# Patient Record
Sex: FEMALE | Race: OTHER | HISPANIC OR LATINO | URBAN - METROPOLITAN AREA
[De-identification: names, ages, dates, MRNs, and addresses within clinical notes are randomized per-mention and may not be internally consistent; named-entity substitution may affect disease eponyms.]

---

## 2022-07-07 NOTE — H&P ADULT - NSHPLABSRESULTS_GEN_ALL_CORE
EKG 7/1/22 SR rate 62 bpm    Cardiac PET 7/1/22- inferior wall ischemia EKG 7/1/22 SR rate 62 bpm    Cardiac PET 7/1/22- inferior wall ischemia, EF 72%

## 2022-07-07 NOTE — H&P ADULT - NSHPREVIEWOFSYSTEMS_GEN_ALL_CORE
General: Pt denies recent weight loss/fever/chills  Neurological: denies numbness or  sensation loss  Cardiovascular: denies chest pain/palpitations/leg edema  Respiratory and Thorax: denies SOB/cough/wheezing  Gastrointestinal: denies abdominal pain/diarrhea/ nausea/ vomiting/ constipation/bloody stool  Genitourinary: denies urinary frequency/urgency/ dysuria/ hematuria   Musculoskeletal: denies joint pain or swelling/ restricted range of motion  Hematologic: denies abnormal bleeding

## 2022-07-07 NOTE — H&P ADULT - PROBLEM SELECTOR PLAN 1
a/w GARCIA and CP   -plan for cardiac cath for ischemic work up  - IVF  cc bolus   -Consent obtained for cardiac catheterization w/ coronary angiogram and possible stent placement. Pt is competent, has capacity, and understands risks and benefits of procedure. Risks and benefits discussed. Risk discussed included, but not limited to MI, stroke, mortality, major bleeding, arrythmia, or infection. All questions answered -plan for cardiac cath for ischemic work up  - IVF  cc bolus   -Consent obtained for cardiac catheterization w/ coronary angiogram and possible stent placement. Pt is competent, has capacity, and understands risks and benefits of procedure. Risks and benefits discussed. Risk discussed included, but not limited to MI, stroke, mortality, major bleeding, arrythmia, or infection. All questions answered

## 2022-07-07 NOTE — H&P ADULT - HISTORY OF PRESENT ILLNESS
70 y/o F with PMHx of HLD, HTN uncontrolled, presented to cardiology with c/o CP and GARCIA. Cardiac PET scan done suggestive of ischemia. Referred for cardiac cath to further evaluate. COVID neg PST  70 y/o F with PMHx of HLD, HTN presented to cardiology with questionable aorta dilation, which was incidentally found on Xray taken for sinus infection. Cardiac PET scan done suggestive of inferior wall ischemia. Referred for cardiac cath to further evaluate. COVID neg PST  72 y/o F with PMHx of HLD, HTN presented to cardiology with questionable aorta dilation, which was incidentally found on Xray taken for sinus infection. Cardiac PET scan done suggestive of inferior wall ischemia. Referred for cardiac cath to further evaluate. COVID neg PST

## 2022-07-07 NOTE — H&P ADULT - ASSESSMENT
70 y/o F with PMHx of HLD, HTN uncontrolled, presented to cardiology with c/o CP and GARCIA. Cardiac PET scan done suggestive of ischemia.     ASA class:  Creatinine:  GFR:  Bleeding  Risk score:  Rk Score:  70 y/o F with PMHx of HLD, HTN presented to cardiology with questionable aorta dilation, which was incidentally found on Xray taken for sinus infection. Cardiac PET scan done suggestive of inferior wall ischemia. Referred for cardiac cath to further evaluate.    ASA class: II  Creatinine: 0.6  GFR: 96  Bleeding  Risk score: 2.1%  Rk Score: 1 point

## 2022-07-07 NOTE — H&P ADULT - NSHPPHYSICALEXAM_GEN_ALL_CORE
Vital Signs :   BP:     157/79   HR:  54    RR:     16   O2 sat;     97% with RA     Constitutional: well developed, well nourished, no deformities and no acute distress  Neurological: Alert & Oriented x 3, METCALF, no focal deficits  HEENT: NC/AT, PERRLA, EOMI,  Neck supple.  Respiratory: CTA B/L, No wheezing/crackles/rhonchi  Cardiovascular: (+) S1 & S2, RRR, No murmur/ rub/ gallop   Gastrointestinal: soft, Nontender, nondistended, (+) BS  Genitourinary: non distended bladder, voiding freely  Extremities: No pedal edema, No clubbing, No cyanosis, 2+  PT/ DP pulses   Skin:  normal skin color and pigmentation, no skin lesions

## 2022-07-07 NOTE — CHART NOTE - NSCHARTNOTEFT_GEN_A_CORE
Preop Phone Call: [phone]  - Able to Reach Patient:  yes  - Info given to:	patient having cardiac catheterization  -  and allergies confirmed: yes  - Medication Information Given: yes  - Medications To Take (specify)	Ok to take a.m. meds w/sip of water  - Medications To Hold (Specify)	none   - Arrival Time: 0815  - NPO after:	0000  - Understanding of Information Verbalized: yes  will have a  over the age of 18  for d/c home  - Understanding of possible overnight stay if stent is placed: yes

## 2022-07-08 ENCOUNTER — OUTPATIENT (OUTPATIENT)
Dept: OUTPATIENT SERVICES | Facility: HOSPITAL | Age: 72
LOS: 1 days | Discharge: HOME | End: 2022-07-08
Payer: MEDICARE

## 2022-07-08 VITALS
HEART RATE: 56 BPM | SYSTOLIC BLOOD PRESSURE: 157 MMHG | HEIGHT: 63 IN | RESPIRATION RATE: 18 BRPM | OXYGEN SATURATION: 100 % | TEMPERATURE: 98 F | WEIGHT: 121.92 LBS | DIASTOLIC BLOOD PRESSURE: 79 MMHG

## 2022-07-08 VITALS
OXYGEN SATURATION: 95 % | SYSTOLIC BLOOD PRESSURE: 139 MMHG | HEART RATE: 58 BPM | RESPIRATION RATE: 16 BRPM | DIASTOLIC BLOOD PRESSURE: 71 MMHG

## 2022-07-08 DIAGNOSIS — R94.39 ABNORMAL RESULT OF OTHER CARDIOVASCULAR FUNCTION STUDY: ICD-10-CM

## 2022-07-08 PROCEDURE — C1769: CPT

## 2022-07-08 PROCEDURE — 93454 CORONARY ARTERY ANGIO S&I: CPT

## 2022-07-08 PROCEDURE — 99153 MOD SED SAME PHYS/QHP EA: CPT

## 2022-07-08 PROCEDURE — 99152 MOD SED SAME PHYS/QHP 5/>YRS: CPT

## 2022-07-08 PROCEDURE — C1894: CPT

## 2022-07-08 PROCEDURE — C1887: CPT

## 2022-07-08 RX ORDER — ROSUVASTATIN CALCIUM 5 MG/1
1 TABLET ORAL
Qty: 0 | Refills: 0 | DISCHARGE

## 2022-07-08 RX ORDER — ASPIRIN/CALCIUM CARB/MAGNESIUM 324 MG
1 TABLET ORAL
Qty: 0 | Refills: 0 | DISCHARGE

## 2022-07-08 RX ORDER — SODIUM CHLORIDE 9 MG/ML
250 INJECTION INTRAMUSCULAR; INTRAVENOUS; SUBCUTANEOUS ONCE
Refills: 0 | Status: COMPLETED | OUTPATIENT
Start: 2022-07-08 | End: 2022-07-08

## 2022-07-08 RX ORDER — FLUTICASONE PROPIONATE 50 MCG
1 SPRAY, SUSPENSION NASAL
Qty: 0 | Refills: 0 | DISCHARGE

## 2022-07-08 RX ORDER — METOPROLOL TARTRATE 50 MG
1 TABLET ORAL
Qty: 0 | Refills: 0 | DISCHARGE

## 2022-07-08 RX ORDER — SODIUM CHLORIDE 9 MG/ML
1000 INJECTION INTRAMUSCULAR; INTRAVENOUS; SUBCUTANEOUS
Refills: 0 | Status: DISCONTINUED | OUTPATIENT
Start: 2022-07-08 | End: 2022-07-08

## 2022-07-08 RX ORDER — LEVOCETIRIZINE DIHYDROCHLORIDE 0.5 MG/ML
1 SOLUTION ORAL
Qty: 0 | Refills: 0 | DISCHARGE

## 2022-07-08 RX ADMIN — SODIUM CHLORIDE 250 MILLILITER(S): 9 INJECTION INTRAMUSCULAR; INTRAVENOUS; SUBCUTANEOUS at 09:56

## 2022-07-08 RX ADMIN — SODIUM CHLORIDE 122 MILLILITER(S): 9 INJECTION INTRAMUSCULAR; INTRAVENOUS; SUBCUTANEOUS at 12:24

## 2022-07-08 NOTE — PACU DISCHARGE NOTE - COMMENTS
Patient s/p LHC. Patient being discharged home as per orders. Patient A&Ox4. Vital signs stable. PIV removed. No evidence of infiltration noted at discharge. Patient with no complaints of pain, SOB, or chest pain at discharge. Right Radial dressing clean dry intact, no s/s of hematoma or bleeding noted.  All discharge paperwork reviewed with patient including medication list. Patient to follow up as directed, patient verbalized understanding to all and without concerns at this time. Patient transported safely off of unit.

## 2022-07-08 NOTE — ASU PATIENT PROFILE, ADULT - FALL HARM RISK - UNIVERSAL INTERVENTIONS
Bed in lowest position, wheels locked, appropriate side rails in place/Call bell, personal items and telephone in reach/Instruct patient to call for assistance before getting out of bed or chair/Non-slip footwear when patient is out of bed/[place] to call system/Physically safe environment - no spills, clutter or unnecessary equipment/Purposeful Proactive Rounding/Room/bathroom lighting operational, light cord in reach

## 2022-07-08 NOTE — PROGRESS NOTE ADULT - SUBJECTIVE AND OBJECTIVE BOX
HPI:  70 y/o F with PMHx of HLD, HTN presented to cardiology with questionable aorta dilation, which was incidentally found on Xray taken for sinus infection. Cardiac PET scan done suggestive of inferior wall ischemia. Referred for cardiac cath to further evaluate.     Now, pt is s/p LHC, revealed non-obstructive CAD     ROS: denies chest pain/ pressure, SOB or palpitation     Vital Signs;  T(C): 36.6 (07-08-22 @ 09:04), Max: 36.6 (07-08-22 @ 09:04)  HR: 49 (07-08-22 @ 12:35) (45 - 56)  BP: 139/68 (07-08-22 @ 12:35) (125/76 - 157/79)  RR: 18 (07-08-22 @ 12:35) (18 - 18)  SpO2: 97% (07-08-22 @ 12:35) (97% - 100%)    PHYSICAL EXAM:  GENERAL: NAD, well-groomed, well-developed  HEENT - NC/AT, pupils equal and reactive to light,  ; Moist mucous membranes, Good dentition, No lesions  NECK: Supple, No JVD  CHEST/LUNG: Clear to auscultation bilaterally; No rales, rhonchi, wheezing  HEART: Regular rate and rhythm; No murmurs, rubs, or gallops  ABDOMEN: Soft, Nontender, Nondistended; Bowel sounds present  EXTREMITIES:  2+ Peripheral Pulses, No clubbing, cyanosis, or edema  NEURO:  No Focal deficits, sensory and motor intact  SKIN: No rashes or lesions, Rt radial access site with radial band (to be removed in 1 hr): no hematoma or bleeding     Outpt labs: reviewed     Medications:  sodium chloride 0.9%. 1000 milliLiter(s) IV Continuous <Continuous>  Home Medications:  Aspir 81 oral delayed release tablet: 1 tab(s) orally once a day (at bedtime) (08 Jul 2022 09:10)  Flonase 50 mcg/inh nasal spray: 1 spray(s) nasal once a day (08 Jul 2022 09:10)  Metoprolol Succinate ER 25 mg oral tablet, extended release: 1 tab(s) orally once a day (08 Jul 2022 09:10)  Multiple Vitamins oral tablet: 1 tab(s) orally once a day (08 Jul 2022 09:10)  rosuvastatin 5 mg oral tablet: 1 tab(s) orally once a day (at bedtime) (08 Jul 2022 09:10)  Xyzal 5 mg oral tablet: 1 tab(s) orally once a day (in the evening) (08 Jul 2022 09:10)    A/P:  70 y/o F with PMHx of HLD, HTN presented to cardiology with questionable aorta dilation, which was incidentally found on Xray taken for sinus infection. Cardiac PET scan done suggestive of inferior wall ischemia. Pt underwent LHC, revealed non-obstructive CAD  (  - post IV hydration: NS at 122cc/hr till discharge home   - continue ASA 81 mg daily   - continue BB  - continue statin  - post procedure, outcome and follow up care reviewed with patient and Dr. Messer  - Discussed therapeutic lifestyle modifications to reduce CAD risk factors including cardiac diet, weight control, exercise, smoking cessation, medication compliance and regular outpt follow-up.   - discharge home today   - follow up with Dr. Tavares on 7/12/22    Discussed the plan with Dr. Messer , Pt and Cath RN.

## 2022-07-11 DIAGNOSIS — R94.39 ABNORMAL RESULT OF OTHER CARDIOVASCULAR FUNCTION STUDY: ICD-10-CM

## 2022-08-01 PROBLEM — Z00.00 ENCOUNTER FOR PREVENTIVE HEALTH EXAMINATION: Status: ACTIVE | Noted: 2022-08-01

## 2022-08-04 ENCOUNTER — NON-APPOINTMENT (OUTPATIENT)
Age: 72
End: 2022-08-04

## 2022-08-04 ENCOUNTER — APPOINTMENT (OUTPATIENT)
Dept: CARDIOLOGY | Facility: CLINIC | Age: 72
End: 2022-08-04

## 2022-08-04 VITALS
OXYGEN SATURATION: 97 % | BODY MASS INDEX: 21.51 KG/M2 | WEIGHT: 126 LBS | RESPIRATION RATE: 16 BRPM | HEIGHT: 64 IN | SYSTOLIC BLOOD PRESSURE: 161 MMHG | DIASTOLIC BLOOD PRESSURE: 87 MMHG | HEART RATE: 67 BPM

## 2022-08-04 DIAGNOSIS — J30.2 OTHER SEASONAL ALLERGIC RHINITIS: ICD-10-CM

## 2022-08-04 PROBLEM — I10 ESSENTIAL (PRIMARY) HYPERTENSION: Chronic | Status: ACTIVE | Noted: 2022-07-07

## 2022-08-04 PROBLEM — E78.5 HYPERLIPIDEMIA, UNSPECIFIED: Chronic | Status: ACTIVE | Noted: 2022-07-07

## 2022-08-04 PROCEDURE — 99204 OFFICE O/P NEW MOD 45 MIN: CPT

## 2022-08-04 PROCEDURE — 93000 ELECTROCARDIOGRAM COMPLETE: CPT

## 2022-08-04 NOTE — ASSESSMENT
[FreeTextEntry1] : EKG: Sinus rhythm with no significant ST or T wave changes.\par \par 71-year-old female with a past medical history of thoracic aortic calcifications presents today for evaluation due to the calcifications and other recent testing.  Stress testing showed an inferior defect but catheterization showed no coronary artery disease at all.  Abdominal aortic Doppler showed no evidence of aneurysm or significant plaquing.  I suggested carotid Doppler to further evaluate for plaquing and determine need for intensification of statin therapy.  She will also have repeat blood work.  She will also have an echocardiogram done also given elevation in her blood pressure.  Blood pressure is significantly elevated here today but she claims is in part on the stress of being here and dealing with her  and her grandchildren.  I have encouraged her to monitor her blood pressure at home and we will use that to determine whether she needs treatment for hypertension.

## 2022-08-04 NOTE — HISTORY OF PRESENT ILLNESS
[FreeTextEntry1] : Patient presents to the office today because she was in her primary doctor's office back in May and had a chest x-ray which demonstrated aortic calcification.  She saw a cardiologist and underwent testing including a stress test and abdominal aortic Doppler.  The aortic Doppler was unremarkable but the stress test showed an area of inferior reversibility concerning for ischemia.  She underwent cardiac catheterization which showed normal coronary arteries.  She then decided that she would like to come to me as her  is a patient of mine.  Symptomatically she is feeling completely well.  Her cough is resolved and she has no other symptoms.  She is able to be very active and do so with no physical limitations including watching her grandchildren.  Patient denies chest pain, shortness of breath, palpitations, orthopnea, presyncope, syncope.

## 2022-08-04 NOTE — DISCUSSION/SUMMARY
[EKG obtained to assist in diagnosis and management of assessed problem(s)] : EKG obtained to assist in diagnosis and management of assessed problem(s) [FreeTextEntry1] : 1.  Check echocardiogram given her elevated blood pressures and aortic calcification to further evaluate her aorta.\par 2.  Check carotid Doppler given her calcification to help determine need for intensification of statin therapy.\par 3.  She will have blood work done.\par 4.  Continue Crestor for dyslipidemia and aortic calcification at current dose for now.\par 5.  Monitor BP at home, keep a log and bring to f/u.\par 6.  No additional cardiac medications at this time.\par 7.  Encourage patient to continue with her healthy diet.\par 8.  Patient is encouraged to exercise at least 30 minutes a day everyday of the week.\par 9.  Follow up here after testing, and will make further recommendations at that time.

## 2022-08-22 ENCOUNTER — APPOINTMENT (OUTPATIENT)
Dept: CARDIOLOGY | Facility: CLINIC | Age: 72
End: 2022-08-22

## 2022-08-22 PROCEDURE — 93880 EXTRACRANIAL BILAT STUDY: CPT

## 2022-08-22 PROCEDURE — 93306 TTE W/DOPPLER COMPLETE: CPT

## 2022-10-13 ENCOUNTER — APPOINTMENT (OUTPATIENT)
Dept: CARDIOLOGY | Facility: CLINIC | Age: 72
End: 2022-10-13

## 2022-10-13 VITALS
HEIGHT: 64 IN | DIASTOLIC BLOOD PRESSURE: 70 MMHG | HEART RATE: 64 BPM | RESPIRATION RATE: 16 BRPM | SYSTOLIC BLOOD PRESSURE: 130 MMHG | OXYGEN SATURATION: 97 %

## 2022-10-13 PROCEDURE — 99213 OFFICE O/P EST LOW 20 MIN: CPT

## 2022-10-13 NOTE — ASSESSMENT
[FreeTextEntry1] : Cardiac catheterization July 8, 2022 showed normal coronary arteries.\par \par Echocardiogram August 22, 2022 demonstrated left ventricle normal size and function with ejection fraction of 55 to 60%.  Mild tricuspid and mild to moderate pulmonic regurgitation noted.\par \par Carotid Doppler August 22, 2022 showed no plaque and was normal bilaterally.\par \par 71-year-old female with a past medical history of thoracic aortic calcifications who presented for evaluation due to aortic calcifications and other recent testing.  Echocardiogram shows a normal EF with mild valve disease.  Carotid shows no plaque.  Blood pressures are very well controlled at this time.  If you have blood work shows some elevation in her LDL and I have recommended increasing her rosuvastatin for her dyslipidemia.  The issues with her right arm appear to be possibly radiculopathic in nature or may be orthopedic.  I recommended follow-up with her primary.  She has good distal pulses and I do not believe it is specifically related to her catheterization.

## 2022-10-13 NOTE — HISTORY OF PRESENT ILLNESS
[FreeTextEntry1] : Patient presents back today feeling generally very well.  She offers really no new complaints with the exception of issues with her arm.  She notes that in her right arm which is the site she had her catheterization on she has been having issues with pain when she moves in certain ways and especially at night.  Also a little bit of tingling in her fingers at times.  She is able to remain active without any symptoms or limitations otherwise.  Blood pressures have been in the 110s to 120s over 60s to 70s.  Patient denies chest pain, shortness of breath, palpitations, orthopnea, presyncope, syncope.

## 2022-10-13 NOTE — DISCUSSION/SUMMARY
[FreeTextEntry1] : 1.  No additional cardiac testing at this time.\par 2.  Increase rosuvastatin to 10 mg daily given elevated LDL.  Repeat blood work in 6 weeks.\par 3.  Monitor BP at home, keep a log and bring to f/u.\par 4.  No additional cardiac medications at this time.\par 5.  Encourage patient to continue with her healthy diet.\par 6.  Patient is encouraged to exercise at least 30 minutes a day everyday of the week.\par 7.  Follow-up with her primary regarding the issues with her right arm which appear to be possibly radiculopathic or may be orthopedic in nature.\par 8.  Follow up here in six months.

## 2022-12-07 DIAGNOSIS — Z79.899 OTHER LONG TERM (CURRENT) DRUG THERAPY: ICD-10-CM

## 2023-04-24 ENCOUNTER — NON-APPOINTMENT (OUTPATIENT)
Age: 73
End: 2023-04-24

## 2023-04-24 ENCOUNTER — APPOINTMENT (OUTPATIENT)
Dept: CARDIOLOGY | Facility: CLINIC | Age: 73
End: 2023-04-24
Payer: MEDICARE

## 2023-04-24 VITALS
SYSTOLIC BLOOD PRESSURE: 144 MMHG | WEIGHT: 126 LBS | HEART RATE: 55 BPM | BODY MASS INDEX: 21.51 KG/M2 | HEIGHT: 64 IN | DIASTOLIC BLOOD PRESSURE: 76 MMHG | RESPIRATION RATE: 16 BRPM

## 2023-04-24 PROCEDURE — 93000 ELECTROCARDIOGRAM COMPLETE: CPT

## 2023-04-24 PROCEDURE — 99213 OFFICE O/P EST LOW 20 MIN: CPT

## 2023-04-24 NOTE — HISTORY OF PRESENT ILLNESS
[FreeTextEntry1] : Patient presents back to the office today feeling very well and offering no complaints.  She got a shot in her arm and is now feeling much better.  She continues to be very active is able to do all of her activities without any symptoms or limitations.  She reports blood pressures in the 120s to low 130s over 70s.  Patient denies chest pain, shortness of breath, palpitations, orthopnea, presyncope, syncope.

## 2023-04-24 NOTE — ASSESSMENT
[FreeTextEntry1] : Cardiac catheterization July 8, 2022 showed normal coronary arteries.\par \par Echocardiogram August 22, 2022 demonstrated left ventricle normal size and function with ejection fraction of 55 to 60%.  Mild tricuspid and mild to moderate pulmonic regurgitation noted.\par \par Carotid Doppler August 22, 2022 showed no plaque and was normal bilaterally.\par \par EKG: Sinus rhythm with no significant ST or T wave changes.\par \par 72-year-old female with a past medical history of dyslipidemia, thoracic aortic calcifications who presents today for follow-up.  Patient is doing very well.  Her Crestor dose was increased up to 20 mg because of elevated LDL and now her lipids are well controlled.  She is tolerating medication without a problem.  Blood pressures are little higher here today but have been better at home.  She will keep a list and bring it next time she comes to ensure that it is adequately controlled.  No need for any medication at this time.

## 2023-04-24 NOTE — DISCUSSION/SUMMARY
[FreeTextEntry1] : 1.  No additional cardiac testing at this time.\par 2.  Continue rosuvastatin at 20 mg daily for her aortic calcifications and dyslipidemia.\par 3.  Monitor BP at home, keep a log and bring to f/u.  No need for any medication at this time.\par 4.  No additional cardiac medications at this time.\par 5.  Encourage patient to continue with her healthy diet.\par 6.  Patient is encouraged to exercise at least 30 minutes a day everyday of the week.\par 7.  Follow up here in six months.  Blood work prior to follow-up. [EKG obtained to assist in diagnosis and management of assessed problem(s)] : EKG obtained to assist in diagnosis and management of assessed problem(s)

## 2023-10-18 ENCOUNTER — NON-APPOINTMENT (OUTPATIENT)
Age: 73
End: 2023-10-18

## 2023-10-18 ENCOUNTER — APPOINTMENT (OUTPATIENT)
Dept: CARDIOLOGY | Facility: CLINIC | Age: 73
End: 2023-10-18
Payer: MEDICARE

## 2023-10-18 VITALS
WEIGHT: 126 LBS | HEIGHT: 64 IN | RESPIRATION RATE: 16 BRPM | BODY MASS INDEX: 21.51 KG/M2 | HEART RATE: 55 BPM | DIASTOLIC BLOOD PRESSURE: 88 MMHG | SYSTOLIC BLOOD PRESSURE: 148 MMHG

## 2023-10-18 PROCEDURE — 93000 ELECTROCARDIOGRAM COMPLETE: CPT

## 2023-10-18 PROCEDURE — 99213 OFFICE O/P EST LOW 20 MIN: CPT

## 2024-04-08 ENCOUNTER — APPOINTMENT (OUTPATIENT)
Dept: CARDIOLOGY | Facility: CLINIC | Age: 74
End: 2024-04-08
Payer: MEDICARE

## 2024-04-08 VITALS
WEIGHT: 127 LBS | RESPIRATION RATE: 16 BRPM | HEIGHT: 64 IN | SYSTOLIC BLOOD PRESSURE: 150 MMHG | HEART RATE: 59 BPM | BODY MASS INDEX: 21.68 KG/M2 | DIASTOLIC BLOOD PRESSURE: 80 MMHG

## 2024-04-08 DIAGNOSIS — R03.0 ELEVATED BLOOD-PRESSURE READING, W/OUT DIAGNOSIS OF HYPERTENSION: ICD-10-CM

## 2024-04-08 DIAGNOSIS — E78.00 PURE HYPERCHOLESTEROLEMIA, UNSPECIFIED: ICD-10-CM

## 2024-04-08 DIAGNOSIS — I70.0 ATHEROSCLEROSIS OF AORTA: ICD-10-CM

## 2024-04-08 PROCEDURE — 99214 OFFICE O/P EST MOD 30 MIN: CPT

## 2024-04-08 PROCEDURE — G2211 COMPLEX E/M VISIT ADD ON: CPT

## 2024-04-08 PROCEDURE — 93000 ELECTROCARDIOGRAM COMPLETE: CPT

## 2024-04-08 NOTE — DISCUSSION/SUMMARY
[FreeTextEntry1] : 1.  No additional cardiac testing at this time. 24-hour blood pressure monitor prior to follow-up to assess blood pressure control. 2.  Continue rosuvastatin at 20 mg daily for her aortic calcifications and dyslipidemia. 3.  Monitor BP at home, keep a log and bring to f/u.  No need for any medication at this time. 4.  No additional cardiac medications at this time. 5.  Encourage patient to continue with her healthy diet. 6.  Patient is encouraged to exercise at least 30 minutes a day everyday of the week. 7.  Follow up here in six months.  Blood work prior to follow-up. [EKG obtained to assist in diagnosis and management of assessed problem(s)] : EKG obtained to assist in diagnosis and management of assessed problem(s)

## 2024-04-08 NOTE — PHYSICAL EXAM
[Well Developed] : well developed [Well Nourished] : well nourished [No Acute Distress] : no acute distress [Normal Conjunctiva] : normal conjunctiva [Normal Venous Pressure] : normal venous pressure [No Carotid Bruit] : no carotid bruit [Normal S1, S2] : normal S1, S2 [No Murmur] : no murmur [No Rub] : no rub [No Gallop] : no gallop [Good Air Entry] : good air entry [No Respiratory Distress] : no respiratory distress  [Soft] : abdomen soft [Non Tender] : non-tender [No Masses/organomegaly] : no masses/organomegaly [Normal Bowel Sounds] : normal bowel sounds [Normal Gait] : normal gait [No Edema] : no edema [No Cyanosis] : no cyanosis [No Clubbing] : no clubbing [No Varicosities] : no varicosities [Moves all extremities] : moves all extremities [No Focal Deficits] : no focal deficits [Normal Speech] : normal speech [Alert and Oriented] : alert and oriented [Normal memory] : normal memory [de-identified] : Wheezing b/l

## 2024-04-08 NOTE — ASSESSMENT
[FreeTextEntry1] : Cardiac catheterization July 8, 2022 showed normal coronary arteries.  Echocardiogram August 22, 2022 demonstrated left ventricle normal size and function with ejection fraction of 55 to 60%. Mild tricuspid and mild to moderate pulmonic regurgitation noted.  Carotid Doppler August 22, 2022 showed no plaque and was normal bilaterally.  EKG: Sinus rhythm with no significant ST or T wave changes.  72-year-old female with a past medical history of dyslipidemia, thoracic aortic calcifications who presents today for follow-up. Patient is doing very well.  She is tolerating the Crestor and her lipids are well controlled.  Blood pressure also appears to be adequately controlled and I will hold off on any medication for now. I will have her do a 24-hour monitor before she comes back to ensure that it is adequately controlled.

## 2024-04-08 NOTE — HISTORY OF PRESENT ILLNESS
[FreeTextEntry1] : Patient presents back to the office today feeling well and offering no complaints.  She continues to be active including taking care of her grandchildren without any symptoms or limitations at all.  Blood pressure at home have been in the 120s to 130s over 70s to 80s.  Patient denies chest pain, shortness of breath, palpitations, orthopnea, presyncope, syncope.

## 2024-05-21 RX ORDER — ROSUVASTATIN CALCIUM 20 MG/1
20 TABLET, FILM COATED ORAL
Qty: 90 | Refills: 1 | Status: ACTIVE | COMMUNITY
Start: 2022-05-19 | End: 1900-01-01

## 2024-10-07 ENCOUNTER — APPOINTMENT (OUTPATIENT)
Dept: CARDIOLOGY | Facility: CLINIC | Age: 74
End: 2024-10-07
Payer: MEDICARE

## 2024-10-08 ENCOUNTER — NON-APPOINTMENT (OUTPATIENT)
Age: 74
End: 2024-10-08

## 2024-10-08 ENCOUNTER — APPOINTMENT (OUTPATIENT)
Dept: CARDIOLOGY | Facility: CLINIC | Age: 74
End: 2024-10-08
Payer: MEDICARE

## 2024-10-08 VITALS
OXYGEN SATURATION: 97 % | WEIGHT: 127 LBS | RESPIRATION RATE: 13 BRPM | DIASTOLIC BLOOD PRESSURE: 70 MMHG | BODY MASS INDEX: 21.8 KG/M2 | HEART RATE: 61 BPM | SYSTOLIC BLOOD PRESSURE: 134 MMHG

## 2024-10-08 DIAGNOSIS — E78.00 PURE HYPERCHOLESTEROLEMIA, UNSPECIFIED: ICD-10-CM

## 2024-10-08 DIAGNOSIS — R03.0 ELEVATED BLOOD-PRESSURE READING, W/OUT DIAGNOSIS OF HYPERTENSION: ICD-10-CM

## 2024-10-08 PROCEDURE — 99214 OFFICE O/P EST MOD 30 MIN: CPT

## 2024-10-08 PROCEDURE — 93000 ELECTROCARDIOGRAM COMPLETE: CPT

## 2024-10-08 PROCEDURE — G2211 COMPLEX E/M VISIT ADD ON: CPT

## 2024-10-08 PROCEDURE — 93784 AMBL BP MNTR W/SOFTWARE: CPT
